# Patient Record
Sex: FEMALE | Race: WHITE | NOT HISPANIC OR LATINO | URBAN - METROPOLITAN AREA
[De-identification: names, ages, dates, MRNs, and addresses within clinical notes are randomized per-mention and may not be internally consistent; named-entity substitution may affect disease eponyms.]

---

## 2020-09-04 ENCOUNTER — HOSPITAL ENCOUNTER (OUTPATIENT)
Dept: SURGERY | Facility: CLINIC | Age: 25
Discharge: HOME | End: 2020-09-04
Attending: NURSE PRACTITIONER

## 2020-09-04 ENCOUNTER — OFFICE VISIT CONVERTED (OUTPATIENT)
Dept: UROLOGY | Facility: CLINIC | Age: 25
End: 2020-09-04
Attending: NURSE PRACTITIONER

## 2020-09-06 LAB
AMPICILLIN SUSC ISLT: 8
AMPICILLIN+SULBAC SUSC ISLT: <=2
BACTERIA UR CULT: ABNORMAL
CEFAZOLIN SUSC ISLT: <=4
CEFEPIME SUSC ISLT: <=0.12
CEFTAZIDIME SUSC ISLT: <=1
CEFTRIAXONE SUSC ISLT: <=0.25
CIPROFLOXACIN SUSC ISLT: <=0.25
ERTAPENEM SUSC ISLT: <=0.12
GENTAMICIN SUSC ISLT: <=1
LEVOFLOXACIN SUSC ISLT: <=0.12
NITROFURANTOIN SUSC ISLT: <=16
PIP+TAZO SUSC ISLT: <=4
TMP SMX SUSC ISLT: <=20
TOBRAMYCIN SUSC ISLT: <=1

## 2021-05-10 NOTE — H&P
History and Physical      Patient Name: Pema Felix   Patient ID: [id number]   Sex: Female   YOB: 1995    Primary Care Provider: Nhi JENKINS   Referring Provider: Nhi JENKINS    Visit Date: September 4, 2020    Provider: GREGORY Harman   [place] [facility]   Location Address: [address] [place] [address]  [id number]   Location Phone: [phone]          Chief Complaint  · pt is here for urological concerns      History Of Present Illness     24-year-old  female patient referred to urology from [facility] urgent care for chronic urinary tract infection.    The patient was seen on 6/26/2024 dysuria and foul odor to her urine.  The patient states that she was told the odor in her urine may be from increased glucose in her urine she states she was then called back 1 week later and was told that her urine culture was positive for E. coli.  She was given ciprofloxacin 1 tablet a day for 3 days.    She states that she returned to the urgent care center on 7/7/2020 and complained of frequency urgency and burning at the end of her stream.  She was told at that point there was nothing else that the urgent care clinic could do for her and she was referred to urology at that point.    The patient states that she has not seen a primary care provider and that her polycystic ovarian syndrome is managed by an OB/GYN in [place].    The patient has a history of kidney stones with a lithotripsy and stent placement in 2013 in 2017.    [facility] Dennis ashley in [place] for a stone work-up.       Past Medical History  Disease Name Date Onset Notes   Bladder Disorder --  --    GERD --  --    Kidney stones --  --    PCOS (polycystic ovarian syndrome) --  --          Past Surgical History  Procedure Name Date Notes   Lithotripsy for kidney stone(s) 2013/2017 with stent         Medication List  Name Date Started Instructions   metformin  "500 mg oral tablet  take 1 tablet (500 mg) by oral route 2 times per day with morning and evening meals   multivitamin oral  take 1 by oral route daily   Sprintec (28) 0.25-35 mg-mcg oral tablet  take 1 tablet by oral route once daily         Allergy List  Allergen Name Date Reaction Notes   NO KNOWN DRUG ALLERGIES --  --  --          Family Medical History  Disease Name Relative/Age Notes   Diabetes, unspecified type Grandfather (paternal)/   Grandfather (paternal)   Renal Calculus Mother/   Mother         Social History  Finding Status Start/Stop Quantity Notes   Tobacco Never --/-- --  --          Review of Systems  · Constitutional  o Denies  o : chills, fever  · Gastrointestinal  o Denies  o : nausea, vomiting  · Genitourinary  o Admits  o : burning with urination, frequency of urine, urgency with urine      Vitals  Date Time BP Position Site L\R Cuff Size HR RR TEMP (F) WT  HT  BMI kg/m2 BSA m2 O2 Sat HC       09/04/2020 08:58 AM       14  249lbs 0oz 5'  3\" 44.11 2.24           Physical Examination  · Constitutional  o Appearance  o : well-nourished, well developed, alert, in no acute distress  · Head and Face  o Head  o :   § Inspection  § : atraumatic, normocephalic  o Face  o :   § Inspection  § : no facial lesions  · Eyes  o Sclerae  o : sclerae white  · Ears, Nose, Mouth and Throat  o Ears  o :   § External Ears  § : appearance within normal limits, no lesions present  o Nose  o :   § External Nose  § : appearance normal  · Neck  o Inspection/Palpation  o : normal appearance, trachea midline  · Respiratory  o Respiratory Effort  o : breathing unlabored  o Inspection of Chest  o : normal appearance, no retractions  o Auscultation of Lungs  o : normal breath sounds throughout  · Skin and Subcutaneous Tissue  o General Inspection  o : no rashes or lesions present, no lesions present, no areas of discoloration  · Neurologic  o Mental Status Examination  o :   § Orientation  § : grossly oriented to person, " place and time  § Speech/Language  § : communication ability within normal limits  o Gait and Station  o : normal gait, able to stand without difficulty  · Psychiatric  o Judgement and Insight  o : judgment and insight intact, judgement for everyday activities and social situations within normal limits, insight intact  o Mood and Affect  o : mood normal, affect appropriate          Results  · In-Office Procedures  o Lab procedure  § Automated dipstick urinalysis with microscopy (13754)   § Color Ur: Yellow   § Clarity Ur: Clear   § Glucose Ur Ql Strip: Negative   § Bilirub Ur Ql Strip: Negative   § Ketones Ur Ql Strip: Negative   § Sp Gr Ur Qn: 1.015   § Hgb Ur Ql Strip: Negative   § pH Ur-LsCnc: 7.0   § Prot Ur Ql Strip: Negative   § Urobilinogen Ur Strip-mCnc: 0.2   § Nitrite Ur Ql Strip: Negative   § WBC Est Ur Ql Strip: Trace   § RBC UrnS Qn HPF: 0   § WBC UrnS Qn HPF: 1-2   § Bacteria UrnS Qn HPF: tntc   § Crystals UrnS Qn HPF: 0   § Epithelial Cells (non renal): 0 /HPF  § Epithelial Cells (renal): 0       Assessment  · Cystitis     595.9/N30.90      Plan  · Orders  o Urine Culture (Clean Catch) [facility] (54503) - 595.9/N30.90 - 09/04/2020  · Medications  o Medications have been Reconciled  o Transition of Care or Provider Policy  · Instructions  o Discussed malodor of urine and perineum with patient at length, believe this can be greatly alleviated with dietary modifications and behavioral management. Patient is to focus on producing dilute urine which will thereby dilute any foul-smelling agent within urine. Discussed with patient that urine odor is typically related to intake, specifically dehydration, what a person eats or drinks and when, medications, and pH of urine. No abnormalities noted on urine dip today. Encourage patient to focus on hydration in order to dilute urine, endorses not drinking much and does not drink water. Also discussed limiting bladder irritants such as caffeine, tobacco, and alcohol  which will likely exacerbate urinary urgency, associated incontinence, and urine odor.Encouraged behavioral modifications including fluid management, hydration, not delaying urgency when she needs to void.  o Recurrent urinary tract infection-no evidence on urine dip of infection today.Discussed with patient that chronic recurrent UTI is a common condition that is multifactorial in nature, difficult to treat, unlikely to completely irradicate, and the specific cause for recurrent infections is often unknown.Encouraged behavioral modifications including fluid management, hydration, not delaying urgency when she needs to void.Recommend cranberry supplementation daily to aid with prevention of urinary tract infection.Consider trial of localized estrogen cream per PCPDiscussed with patient the importance of obtaining urine culture prior to treatment with antibiotics for urinary tract infection.  o will send patient's urine for culture today as there is many bacteria noted on microscopic exam. Discussed with patient that we may proceed with diagnostic imaging should she have an established pattern of bacterial infections at her next visit in 3 months.  o Electronically Identified Patient Education Materials Provided Electronically            Electronically Signed by: GREGORY Harman -Author on September 4, 2020 10:03:18 AM

## 2021-05-14 VITALS — HEIGHT: 63 IN | RESPIRATION RATE: 16 BRPM | BODY MASS INDEX: 44.12 KG/M2 | WEIGHT: 249 LBS

## 2022-04-15 ENCOUNTER — TRANSCRIBE ORDERS (OUTPATIENT)
Dept: DIABETES SERVICES | Facility: HOSPITAL | Age: 27
End: 2022-04-15

## 2022-04-15 DIAGNOSIS — E66.01 CLASS 3 SEVERE OBESITY DUE TO EXCESS CALORIES WITH BODY MASS INDEX (BMI) OF 40.0 TO 44.9 IN ADULT, UNSPECIFIED WHETHER SERIOUS COMORBIDITY PRESENT: Primary | ICD-10-CM
